# Patient Record
Sex: FEMALE | Race: WHITE | NOT HISPANIC OR LATINO | URBAN - METROPOLITAN AREA
[De-identification: names, ages, dates, MRNs, and addresses within clinical notes are randomized per-mention and may not be internally consistent; named-entity substitution may affect disease eponyms.]

---

## 2020-10-02 ENCOUNTER — INPATIENT (INPATIENT)
Facility: HOSPITAL | Age: 80
LOS: 0 days | Discharge: HOME | DRG: 815 | End: 2020-10-03
Attending: SURGERY | Admitting: SURGERY
Payer: MEDICARE

## 2020-10-02 VITALS
HEART RATE: 93 BPM | RESPIRATION RATE: 18 BRPM | OXYGEN SATURATION: 99 % | HEIGHT: 66 IN | SYSTOLIC BLOOD PRESSURE: 155 MMHG | TEMPERATURE: 98 F | DIASTOLIC BLOOD PRESSURE: 73 MMHG | WEIGHT: 134.92 LBS

## 2020-10-02 DIAGNOSIS — Z90.49 ACQUIRED ABSENCE OF OTHER SPECIFIED PARTS OF DIGESTIVE TRACT: Chronic | ICD-10-CM

## 2020-10-02 DIAGNOSIS — Z98.890 OTHER SPECIFIED POSTPROCEDURAL STATES: Chronic | ICD-10-CM

## 2020-10-02 DIAGNOSIS — Z93.3 COLOSTOMY STATUS: Chronic | ICD-10-CM

## 2020-10-02 LAB
APTT BLD: 22.8 SEC — LOW (ref 27.5–35.5)
BLD GP AB SCN SERPL QL: NEGATIVE — SIGNIFICANT CHANGE UP
HCT VFR BLD CALC: 42 % — SIGNIFICANT CHANGE UP (ref 34.5–45)
HGB BLD-MCNC: 13.4 G/DL — SIGNIFICANT CHANGE UP (ref 11.5–15.5)
INR BLD: 0.91 — SIGNIFICANT CHANGE UP (ref 0.88–1.16)
MCHC RBC-ENTMCNC: 31.4 PG — SIGNIFICANT CHANGE UP (ref 27–34)
MCHC RBC-ENTMCNC: 31.9 GM/DL — LOW (ref 32–36)
MCV RBC AUTO: 98.4 FL — SIGNIFICANT CHANGE UP (ref 80–100)
NRBC # BLD: 0 /100 WBCS — SIGNIFICANT CHANGE UP (ref 0–0)
PLATELET # BLD AUTO: 157 K/UL — SIGNIFICANT CHANGE UP (ref 150–400)
PROTHROM AB SERPL-ACNC: 11 SEC — SIGNIFICANT CHANGE UP (ref 10.6–13.6)
RBC # BLD: 4.27 M/UL — SIGNIFICANT CHANGE UP (ref 3.8–5.2)
RBC # FLD: 12.4 % — SIGNIFICANT CHANGE UP (ref 10.3–14.5)
RH IG SCN BLD-IMP: POSITIVE — SIGNIFICANT CHANGE UP
SARS-COV-2 RNA SPEC QL NAA+PROBE: SIGNIFICANT CHANGE UP
WBC # BLD: 9.24 K/UL — SIGNIFICANT CHANGE UP (ref 3.8–10.5)
WBC # FLD AUTO: 9.24 K/UL — SIGNIFICANT CHANGE UP (ref 3.8–10.5)

## 2020-10-02 PROCEDURE — 99285 EMERGENCY DEPT VISIT HI MDM: CPT

## 2020-10-02 PROCEDURE — 93010 ELECTROCARDIOGRAM REPORT: CPT

## 2020-10-02 PROCEDURE — 71045 X-RAY EXAM CHEST 1 VIEW: CPT | Mod: 26

## 2020-10-02 RX ORDER — MORPHINE SULFATE 50 MG/1
4 CAPSULE, EXTENDED RELEASE ORAL ONCE
Refills: 0 | Status: DISCONTINUED | OUTPATIENT
Start: 2020-10-02 | End: 2020-10-02

## 2020-10-02 RX ORDER — SODIUM CHLORIDE 9 MG/ML
1000 INJECTION INTRAMUSCULAR; INTRAVENOUS; SUBCUTANEOUS
Refills: 0 | Status: DISCONTINUED | OUTPATIENT
Start: 2020-10-02 | End: 2020-10-02

## 2020-10-02 RX ORDER — SODIUM CHLORIDE 9 MG/ML
1000 INJECTION, SOLUTION INTRAVENOUS
Refills: 0 | Status: DISCONTINUED | OUTPATIENT
Start: 2020-10-02 | End: 2020-10-03

## 2020-10-02 RX ORDER — LEVOTHYROXINE SODIUM 125 MCG
50 TABLET ORAL AT BEDTIME
Refills: 0 | Status: DISCONTINUED | OUTPATIENT
Start: 2020-10-02 | End: 2020-10-03

## 2020-10-02 RX ORDER — PANTOPRAZOLE SODIUM 20 MG/1
40 TABLET, DELAYED RELEASE ORAL DAILY
Refills: 0 | Status: DISCONTINUED | OUTPATIENT
Start: 2020-10-02 | End: 2020-10-03

## 2020-10-02 RX ADMIN — SODIUM CHLORIDE 100 MILLILITER(S): 9 INJECTION INTRAMUSCULAR; INTRAVENOUS; SUBCUTANEOUS at 19:26

## 2020-10-02 RX ADMIN — MORPHINE SULFATE 4 MILLIGRAM(S): 50 CAPSULE, EXTENDED RELEASE ORAL at 19:41

## 2020-10-02 RX ADMIN — MORPHINE SULFATE 4 MILLIGRAM(S): 50 CAPSULE, EXTENDED RELEASE ORAL at 19:26

## 2020-10-02 NOTE — ED ADULT NURSE NOTE - OBJECTIVE STATEMENT
81 yo female c/o abdominal pain. Pt. went to endoscopy and colonoscopy, pt. reports feeling abdominal pain upon awakening and CT Scan revealed splenic hemorrhage. Denies chest pain, SOB, fever/chills, n/v/d.

## 2020-10-02 NOTE — H&P ADULT - NSICDXPASTSURGICALHX_GEN_ALL_CORE_FT
PAST SURGICAL HISTORY:  H/O shoulder surgery     History of ankle surgery     History of appendectomy     S/P cholecystectomy     S/P colostomy

## 2020-10-02 NOTE — ED ADULT NURSE NOTE - NSIMPLEMENTINTERV_GEN_ALL_ED
Implemented All Universal Safety Interventions:  [employer] [place] to call system. Call bell, personal items and telephone within reach. Instruct patient to call for assistance. Room bathroom lighting operational. Non-slip footwear when patient is off stretcher. Physically safe environment: no spills, clutter or unnecessary equipment. Stretcher in lowest position, wheels locked, appropriate side rails in place.

## 2020-10-02 NOTE — ED PROVIDER NOTE - PROGRESS NOTE DETAILS
Surgery consulted and will see the pt. images being uploaded at this time Verbal report from Dr Younger: small amount of blood around the spleen, no clear laceration seen Pt seen by gen surgery - admit to Dr Murphy, tele bed. No acute surgical intervention at this time

## 2020-10-02 NOTE — ED PROVIDER NOTE - PHYSICAL EXAMINATION
Limited PE performed in the setting of the COVID10 pandemic, in efforts to limit exposure and cross-contamination  Constitutional: Well appearing, well nourished, awake, alert, oriented to person, place, time/situation and in no apparent distress.  ENMT: Airway patent.   Eyes: Clear bilaterally  Cardiac: Normal rate, regular rhythm.   Respiratory: No increased WOB, tachypnea, hypoxia, or accessory mm use. Pt speaks in full sentences.   Gastrointestinal: Abd soft, + ttp in the LUQ / epigastric region. ND. No guarding, rebound, or rigidity.   Musculoskeletal: Range of motion is not limited  Neuro: Alert and oriented x 3, face symmetric and speech fluent. Nml gross motor movement, grossly non focal   Skin: Skin normal color for race, warm, dry and intact. No evidence of rash.  Psych: Alert and oriented to person, place, time/situation. normal mood and affect. no apparent risk to self or others.

## 2020-10-02 NOTE — H&P ADULT - NSHPPHYSICALEXAM_GEN_ALL_CORE
T(C): 36.4 (10-02-20 @ 15:50), Max: 36.4 (10-02-20 @ 15:50)  HR: 84 (10-02-20 @ 17:32) (84 - 93)  BP: 169/71 (10-02-20 @ 19:46) (143/66 - 169/71)  RR: 18 (10-02-20 @ 17:32) (18 - 18)  SpO2: 99% (10-02-20 @ 17:32) (99% - 99%)    GENERAL: NAD, Resting comfortably in bed, awake  HEENT: NCAT, MMM, Trachea midline, Normal conjunctiva, PERRL  RESP: Nonlabored breathing, No respiratory distress  CARD: Normal rate, Normal peripheral perfusion  GI: Soft, ND, mod TTP LUQ, No guarding, No rebound tenderness  EXTREM: WWP, No edema, No gross deformity of extremities  SKIN: No rashes, no lesions  NEURO: AAOx3, No focal motor or sensory deficits  PSYCH: Affect and characteristics of appearance, verbalizations, behaviors are appropriate

## 2020-10-02 NOTE — H&P ADULT - ATTENDING COMMENTS
Patient seen and examined at bedside on 10/3/2020.  Feeling better.  Stated pain is moving toward her back.  Abdomen soft, minimal tenderness.  Labs, outside imaging reviewed.    Serial H/H have been stable.  Advance diet  Discharge home

## 2020-10-02 NOTE — ED ADULT TRIAGE NOTE - CHIEF COMPLAINT QUOTE
"I just had a colonoscopy and endoscopy and when I woke up from the anesthesia I got a terrible pain in my stomach. I had a CT done and the radiologist said they saw some blood and told me to come in. I'm being admitted."

## 2020-10-02 NOTE — H&P ADULT - NSICDXPASTMEDICALHX_GEN_ALL_CORE_FT
PAST MEDICAL HISTORY:  Diabetes mellitus with insulin therapy resolved    Diverticulitis     History of herpes zoster of eye     Hypothyroidism     Nephrotic syndrome

## 2020-10-02 NOTE — ED PROVIDER NOTE - OBJECTIVE STATEMENT
Pt w/ PMHx Pt w/ PMHx of hypothyroidism, nephrotic syndrome <was on chronic long term steroids c/b DM (now resolved), bowel perforation s/p colostomy in 2007 with reversal 9 mos later, AMANDA/BSO, open cholecystectomy, lap appendectomy, today referred to the ED by GI Dr Younger. Pt had EGD and colonoscopy earlier today. S/p the procedure, pt began c/o LUQ pain. Pt was sent for outpt CT a/p, which pt reports she was told she had "a small amount of bleeding around the spleen." Pt presents CD w/ imaging to be uploaded into pacs, but does not have written report. Pt reports 3/10 pain. Denies n/v/d/c, f/c. Pt denies feeling faint, weakness / fatigue, CP, SOB.

## 2020-10-02 NOTE — ED PROVIDER NOTE - CLINICAL SUMMARY MEDICAL DECISION MAKING FREE TEXT BOX
Pt presents to the ED w/ abd pain s/p EGD / cscope earlier today w/ CT showing splenic injury. Pre-op labs, frequent monitoring of VS, large bore IV, surgery consult. Anticipate admission to surgery for obs vs surgical intervention.

## 2020-10-02 NOTE — H&P ADULT - HISTORY OF PRESENT ILLNESS
80F PMHx of nephrotic syndrome, bowel perforation 2/2 diverticulitis s/p colostomy in 2007 at [facility] (with reversal 9 mos later), AMANDA/BSO, open cholecystectomy, lap appendectomy, IDDM (resolved, hypothyroidism, herpetic keratitis, who presents with abd pain x 1 day. Pt underwent EGD/colonoscopy today for workup of narrowing of stool caliber and fecal incontinence. Reports the development of acute abd pain immediately after waking up, underwent outpatient CT scan and was told by radiologist that she had blood surrounding her spleen, so she was sent to the ED. In the ED patient afebrile, HR 93, /73, O2 99% on RA. WBC 10.6, Hgb 14.1. Outpatient CT scan showing small/moderate bleed near splenic recess. 80F PMHx of nephrotic syndrome, bowel perforation 2/2 diverticulitis s/p colostomy in 2007 at [facility] (with reversal 9 mos later), AMANDA/BSO, open cholecystectomy, lap appendectomy, IDDM (resolved, hypothyroidism, herpetic keratitis, who presents with abd pain x 1 day. Pt underwent EGD/colonoscopy today for workup of narrowing of stool caliber and fecal incontinence. Reports the development of acute abd pain immediately after waking up, underwent outpatient CT scan and was told by radiologist that she had blood surrounding her spleen, so she was sent to the ED. In the ED patient afebrile, HR 93, /73, O2 99% on RA. WBC 10.6, Hgb 14.1. Outpatient CT scan showing small/moderate bleed near splenic recess.     Meds: B12 5k-100mcg daily, biotin 10 mg daily, VitD3 10,000 unit daily, cilostazol 200 mg daily, famotidine 40 qHS, pantoprazole 40 daily, restoril 30 mg qHS, synthroid 50 mcg every other day, synthroid 75 mcg EOD, ultram 50 mg daily prn pain, Valtrex 500 mg daily, Xanax 0.5 mg qHS, zetia 10 mg daily

## 2020-10-02 NOTE — H&P ADULT - ASSESSMENT
80F PMHx of nephrotic syndrome, bowel perforation 2/2 diverticulitis s/p colostomy in 2007 at [facility] (with reversal 9 mos later), AMANDA/BSO, open cholecystectomy, lap appendectomy, IDDM (resolved), hypothyroidism, who presents with abd pain and splenic bleed after colonoscopy today.     - Admit to General surgery, team 4, Dr. Murphy  - Minimize pain meds  - NPO/IVF  - Serial abd exams  - Trend H/H q6h  - Inform IR of patient  - CTA if patient’s exam worsens or if H/H continues to downtrend  - Preoperative labs/studies  - SCDs, no chemoppx given bleed  - AM labs

## 2020-10-02 NOTE — H&P ADULT - NSHPLABSRESULTS_GEN_ALL_CORE
14.1   10.63 )-----------( 177      ( 02 Oct 2020 17:30 )             43.9   10-02    143  |  104  |  18  ----------------------------<  90  4.3   |  29  |  0.80    Ca    9.0      02 Oct 2020 17:30    TPro  6.7  /  Alb  4.2  /  TBili  0.4  /  DBili  x   /  AST  35  /  ALT  30  /  AlkPhos  113  10-02    Outside CT showing bleed near spleen. No formal radiology read.

## 2020-10-02 NOTE — ED ADULT NURSE NOTE - PMH
Diabetes mellitus with insulin therapy  resolved  Diverticulitis    History of herpes zoster of eye    Hypothyroidism    Nephrotic syndrome

## 2020-10-02 NOTE — H&P ADULT - NUTRITIONAL ASSESSMENT
80F PMHx of nephrotic syndrome, bowel perforation 2/2 diverticulitis s/p colostomy in 2007 at [facility] (with reversal 9 mos later), AMANDA/BSO, open cholecystectomy, lap appendectomy, IDDM (resolved), hypothyroidism, who presents with abd pain and splenic bleed after colonoscopy today.     - Admit to General surgery, team 4, Dr. Murphy  - Minimize pain meds  - NPO/IVF  - Serial abd exams  - Trend H/H q6h  - Inform IR of patient  - CTA if patient’s exam worsens or if H/H continues to downtrend  - Preoperative labs/studies  - SCDs, no chemoppx given bleed  - AM labs

## 2020-10-03 ENCOUNTER — TRANSCRIPTION ENCOUNTER (OUTPATIENT)
Age: 80
End: 2020-10-03

## 2020-10-03 VITALS
RESPIRATION RATE: 18 BRPM | OXYGEN SATURATION: 98 % | SYSTOLIC BLOOD PRESSURE: 141 MMHG | DIASTOLIC BLOOD PRESSURE: 73 MMHG | HEART RATE: 116 BPM

## 2020-10-03 DIAGNOSIS — N04.9 NEPHROTIC SYNDROME WITH UNSPECIFIED MORPHOLOGIC CHANGES: ICD-10-CM

## 2020-10-03 DIAGNOSIS — E03.9 HYPOTHYROIDISM, UNSPECIFIED: ICD-10-CM

## 2020-10-03 DIAGNOSIS — S36.039A UNSPECIFIED LACERATION OF SPLEEN, INITIAL ENCOUNTER: ICD-10-CM

## 2020-10-03 LAB
ANION GAP SERPL CALC-SCNC: 10 MMOL/L — SIGNIFICANT CHANGE UP (ref 5–17)
ANION GAP SERPL CALC-SCNC: 12 MMOL/L — SIGNIFICANT CHANGE UP (ref 5–17)
BLD GP AB SCN SERPL QL: NEGATIVE — SIGNIFICANT CHANGE UP
BUN SERPL-MCNC: 15 MG/DL — SIGNIFICANT CHANGE UP (ref 7–23)
BUN SERPL-MCNC: 15 MG/DL — SIGNIFICANT CHANGE UP (ref 7–23)
CALCIUM SERPL-MCNC: 9 MG/DL — SIGNIFICANT CHANGE UP (ref 8.4–10.5)
CALCIUM SERPL-MCNC: 9 MG/DL — SIGNIFICANT CHANGE UP (ref 8.4–10.5)
CHLORIDE SERPL-SCNC: 105 MMOL/L — SIGNIFICANT CHANGE UP (ref 96–108)
CHLORIDE SERPL-SCNC: 106 MMOL/L — SIGNIFICANT CHANGE UP (ref 96–108)
CO2 SERPL-SCNC: 26 MMOL/L — SIGNIFICANT CHANGE UP (ref 22–31)
CO2 SERPL-SCNC: 27 MMOL/L — SIGNIFICANT CHANGE UP (ref 22–31)
CREAT SERPL-MCNC: 0.68 MG/DL — SIGNIFICANT CHANGE UP (ref 0.5–1.3)
CREAT SERPL-MCNC: 0.72 MG/DL — SIGNIFICANT CHANGE UP (ref 0.5–1.3)
GLUCOSE SERPL-MCNC: 86 MG/DL — SIGNIFICANT CHANGE UP (ref 70–99)
GLUCOSE SERPL-MCNC: 88 MG/DL — SIGNIFICANT CHANGE UP (ref 70–99)
HCT VFR BLD CALC: 38.3 % — SIGNIFICANT CHANGE UP (ref 34.5–45)
HCT VFR BLD CALC: 41 % — SIGNIFICANT CHANGE UP (ref 34.5–45)
HCT VFR BLD CALC: 41.4 % — SIGNIFICANT CHANGE UP (ref 34.5–45)
HGB BLD-MCNC: 12.5 G/DL — SIGNIFICANT CHANGE UP (ref 11.5–15.5)
HGB BLD-MCNC: 13.2 G/DL — SIGNIFICANT CHANGE UP (ref 11.5–15.5)
HGB BLD-MCNC: 13.5 G/DL — SIGNIFICANT CHANGE UP (ref 11.5–15.5)
MAGNESIUM SERPL-MCNC: 2 MG/DL — SIGNIFICANT CHANGE UP (ref 1.6–2.6)
MAGNESIUM SERPL-MCNC: 2.1 MG/DL — SIGNIFICANT CHANGE UP (ref 1.6–2.6)
MCHC RBC-ENTMCNC: 31.2 PG — SIGNIFICANT CHANGE UP (ref 27–34)
MCHC RBC-ENTMCNC: 31.8 PG — SIGNIFICANT CHANGE UP (ref 27–34)
MCHC RBC-ENTMCNC: 31.8 PG — SIGNIFICANT CHANGE UP (ref 27–34)
MCHC RBC-ENTMCNC: 32.2 GM/DL — SIGNIFICANT CHANGE UP (ref 32–36)
MCHC RBC-ENTMCNC: 32.6 GM/DL — SIGNIFICANT CHANGE UP (ref 32–36)
MCHC RBC-ENTMCNC: 32.6 GM/DL — SIGNIFICANT CHANGE UP (ref 32–36)
MCV RBC AUTO: 96.9 FL — SIGNIFICANT CHANGE UP (ref 80–100)
MCV RBC AUTO: 97.4 FL — SIGNIFICANT CHANGE UP (ref 80–100)
MCV RBC AUTO: 97.5 FL — SIGNIFICANT CHANGE UP (ref 80–100)
NRBC # BLD: 0 /100 WBCS — SIGNIFICANT CHANGE UP (ref 0–0)
PHOSPHATE SERPL-MCNC: 3.1 MG/DL — SIGNIFICANT CHANGE UP (ref 2.5–4.5)
PHOSPHATE SERPL-MCNC: 3.1 MG/DL — SIGNIFICANT CHANGE UP (ref 2.5–4.5)
PLATELET # BLD AUTO: 154 K/UL — SIGNIFICANT CHANGE UP (ref 150–400)
PLATELET # BLD AUTO: 161 K/UL — SIGNIFICANT CHANGE UP (ref 150–400)
PLATELET # BLD AUTO: 184 K/UL — SIGNIFICANT CHANGE UP (ref 150–400)
POTASSIUM SERPL-MCNC: 3.8 MMOL/L — SIGNIFICANT CHANGE UP (ref 3.5–5.3)
POTASSIUM SERPL-MCNC: 4 MMOL/L — SIGNIFICANT CHANGE UP (ref 3.5–5.3)
POTASSIUM SERPL-SCNC: 3.8 MMOL/L — SIGNIFICANT CHANGE UP (ref 3.5–5.3)
POTASSIUM SERPL-SCNC: 4 MMOL/L — SIGNIFICANT CHANGE UP (ref 3.5–5.3)
RBC # BLD: 3.93 M/UL — SIGNIFICANT CHANGE UP (ref 3.8–5.2)
RBC # BLD: 4.23 M/UL — SIGNIFICANT CHANGE UP (ref 3.8–5.2)
RBC # BLD: 4.25 M/UL — SIGNIFICANT CHANGE UP (ref 3.8–5.2)
RBC # FLD: 12.2 % — SIGNIFICANT CHANGE UP (ref 10.3–14.5)
RBC # FLD: 12.2 % — SIGNIFICANT CHANGE UP (ref 10.3–14.5)
RBC # FLD: 12.3 % — SIGNIFICANT CHANGE UP (ref 10.3–14.5)
RH IG SCN BLD-IMP: POSITIVE — SIGNIFICANT CHANGE UP
SODIUM SERPL-SCNC: 142 MMOL/L — SIGNIFICANT CHANGE UP (ref 135–145)
SODIUM SERPL-SCNC: 144 MMOL/L — SIGNIFICANT CHANGE UP (ref 135–145)
WBC # BLD: 6.3 K/UL — SIGNIFICANT CHANGE UP (ref 3.8–10.5)
WBC # BLD: 6.96 K/UL — SIGNIFICANT CHANGE UP (ref 3.8–10.5)
WBC # BLD: 8.5 K/UL — SIGNIFICANT CHANGE UP (ref 3.8–10.5)
WBC # FLD AUTO: 6.3 K/UL — SIGNIFICANT CHANGE UP (ref 3.8–10.5)
WBC # FLD AUTO: 6.96 K/UL — SIGNIFICANT CHANGE UP (ref 3.8–10.5)
WBC # FLD AUTO: 8.5 K/UL — SIGNIFICANT CHANGE UP (ref 3.8–10.5)

## 2020-10-03 PROCEDURE — 99222 1ST HOSP IP/OBS MODERATE 55: CPT | Mod: GC

## 2020-10-03 PROCEDURE — 99223 1ST HOSP IP/OBS HIGH 75: CPT

## 2020-10-03 RX ORDER — LANOLIN ALCOHOL/MO/W.PET/CERES
3 CREAM (GRAM) TOPICAL ONCE
Refills: 0 | Status: COMPLETED | OUTPATIENT
Start: 2020-10-03 | End: 2020-10-03

## 2020-10-03 RX ORDER — ACETAMINOPHEN 500 MG
1000 TABLET ORAL ONCE
Refills: 0 | Status: COMPLETED | OUTPATIENT
Start: 2020-10-03 | End: 2020-10-03

## 2020-10-03 RX ADMIN — Medication 50 MICROGRAM(S): at 01:25

## 2020-10-03 RX ADMIN — Medication 1000 MILLIGRAM(S): at 07:30

## 2020-10-03 RX ADMIN — PANTOPRAZOLE SODIUM 40 MILLIGRAM(S): 20 TABLET, DELAYED RELEASE ORAL at 11:28

## 2020-10-03 RX ADMIN — Medication 3 MILLIGRAM(S): at 01:24

## 2020-10-03 RX ADMIN — Medication 400 MILLIGRAM(S): at 07:03

## 2020-10-03 NOTE — PROGRESS NOTE ADULT - ASSESSMENT
80yF who underwent EGD/colonoscopy on 10/2 for workup of narrowing of stool caliber and fecal incontinence, subsequently developed acute abdominal pain and found on CT to have small/moderate bleeding near the splenic recess. Abd pain is improving this morning. Hgb 12.5 from 13.2 at 3am.      - Minimize pain meds  - NPO/IVF  - Serial abd exams  - Trend H/H q6h  - CTA if patient’s exam worsens or if H/H continues to downtrend  - SCDs, no chemoppx given bleed  - AM labs

## 2020-10-03 NOTE — CONSULT NOTE ADULT - ASSESSMENT
80yF who underwent EGD/colonoscopy on 10/2 for workup of narrowing of stool caliber and fecal incontinence, subsequently developed acute abdominal pain and found on CT to have small/moderate bleeding near the splenic recess now w/ improved symptoms

## 2020-10-03 NOTE — CONSULT NOTE ADULT - SUBJECTIVE AND OBJECTIVE BOX
Patient is a 80y old  Female who presents with a chief complaint of Splenic bleed (03 Oct 2020 15:08)      HPI:  80F PMHx of nephrotic syndrome, bowel perforation 2/2 diverticulitis s/p colostomy in 2007 at [facility] (with reversal 9 mos later), AMANDA/BSO, open cholecystectomy, lap appendectomy, IDDM (resolved, hypothyroidism, herpetic keratitis, who presents with abd pain x 1 day. Pt underwent EGD/colonoscopy today for workup of narrowing of stool caliber and fecal incontinence. Reports the development of acute abd pain immediately after waking up, underwent outpatient CT scan and was told by radiologist that she had blood surrounding her spleen, so she was sent to the ED. In the ED patient afebrile, HR 93, /73, O2 99% on RA. WBC 10.6, Hgb 14.1. Outpatient CT scan showing small/moderate bleed near splenic recess.     Meds: B12 5k-100mcg daily, biotin 10 mg daily, VitD3 10,000 unit daily, cilostazol 200 mg daily, famotidine 40 qHS, pantoprazole 40 daily, restoril 30 mg qHS, synthroid 50 mcg every other day, synthroid 75 mcg EOD, ultram 50 mg daily prn pain, Valtrex 500 mg daily, Xanax 0.5 mg qHS, zetia 10 mg daily   (02 Oct 2020 20:15)    Subjective:  Pt feels improved. She has residual pain in her LUQ but it is improving. Denies SOB, CP. ROS is otherwise negative.     Allergies    Lasix (Other)  penicillin (Other)    Intolerances    Home meds: none    MEDICATIONS  (STANDING):  lactated ringers. 1000 milliLiter(s) (100 mL/Hr) IV Continuous <Continuous>  levothyroxine Injectable 50 MICROGram(s) IV Push at bedtime  pantoprazole  Injectable 40 milliGRAM(s) IV Push daily    MEDICATIONS  (PRN):      Drug Dosing Weight  Height (cm): 167.6 (02 Oct 2020 15:50)  Weight (kg): 61.2 (02 Oct 2020 15:50)  BMI (kg/m2): 21.8 (02 Oct 2020 15:50)  BSA (m2): 1.69 (02 Oct 2020 15:50)    PAST MEDICAL & SURGICAL HISTORY:  History of herpes zoster of eye    Hypothyroidism    Diabetes mellitus with insulin therapy  resolved    Diverticulitis    Nephrotic syndrome    H/O shoulder surgery    History of ankle surgery    S/P colostomy    History of appendectomy    S/P cholecystectomy        FAMILY HISTORY:  No pertinent family history in first degree relatives of cardiac disease    SOCIAL HISTORY:  no smoking, social EtOH use, no drug use    ADVANCE DIRECTIVES:    Vital Signs Last 24 Hrs  T(C): 36.8 (03 Oct 2020 13:00), Max: 36.8 (03 Oct 2020 09:23)  T(F): 98.2 (03 Oct 2020 13:00), Max: 98.3 (03 Oct 2020 09:23)  HR: 116 (03 Oct 2020 15:53) (72 - 116)  BP: 141/73 (03 Oct 2020 15:53) (141/73 - 171/73)  BP(mean): 96 (03 Oct 2020 15:53) (91 - 105)  ABP: --  ABP(mean): --  RR: 18 (03 Oct 2020 15:53) (18 - 18)  SpO2: 98% (03 Oct 2020 15:53) (96% - 98%)          I&O's Detail    02 Oct 2020 07:01  -  03 Oct 2020 07:00  --------------------------------------------------------  IN:    Lactated Ringers: 800 mL  Total IN: 800 mL    OUT:    Voided (mL): 500 mL  Total OUT: 500 mL    Total NET: 300 mL      03 Oct 2020 07:01  -  03 Oct 2020 22:51  --------------------------------------------------------  IN:    Lactated Ringers: 900 mL  Total IN: 900 mL    OUT:    Voided (mL): 200 mL  Total OUT: 200 mL    Total NET: 700 mL          PHYSICAL EXAM:      Constitutional: NAD  Eyes: PERRLA  ENMT: MMM  Neck: supple  Back: midline  Respiratory: CTA b/l  Cardiovascular: rrr, s1s2, no m/r/g  Gastrointestinal: TTP in LUQ  Extremities: wwp  Vascular: + 2 pulses radial, LLE edema in foot (pt reports is chronic)  Neurological: AAO x 4  Skin: no rash  Lymph Nodes: no LAD  Musculoskeletal: no joint swelling  Psychiatric: normal affect        LABS:  CBC Full  -  ( 03 Oct 2020 12:28 )  WBC Count : 8.50 K/uL  RBC Count : 4.25 M/uL  Hemoglobin : 13.5 g/dL  Hematocrit : 41.4 %  Platelet Count - Automated : 161 K/uL  Mean Cell Volume : 97.4 fl  Mean Cell Hemoglobin : 31.8 pg  Mean Cell Hemoglobin Concentration : 32.6 gm/dL  Auto Neutrophil # : x  Auto Lymphocyte # : x  Auto Monocyte # : x  Auto Eosinophil # : x  Auto Basophil # : x  Auto Neutrophil % : x  Auto Lymphocyte % : x  Auto Monocyte % : x  Auto Eosinophil % : x  Auto Basophil % : x    10-03    142  |  105  |  15  ----------------------------<  88  4.0   |  27  |  0.68    Ca    9.0      03 Oct 2020 06:11  Phos  3.1     10-03  Mg     2.0     10-03    TPro  6.7  /  Alb  4.2  /  TBili  0.4  /  DBili  x   /  AST  35  /  ALT  30  /  AlkPhos  113  10-02    CAPILLARY BLOOD GLUCOSE        PT/INR - ( 02 Oct 2020 17:30 )   PT: 11.0 sec;   INR: 0.91          PTT - ( 02 Oct 2020 17:30 )  PTT:22.8 sec      EKG:    ECHO, US:    RADIOLOGY:

## 2020-10-03 NOTE — CHART NOTE - NSCHARTNOTEFT_GEN_A_CORE
Serial Abdominal Exam    Exam done at 23:00 on 10/2/2020    Subjective: Patient describes that she is feeling well. Does not have any nausea or vomiting. Only symptom is abdominal pain that is localized to the upper left side of her abdomen.     Objective:  ICU Vital Signs Last 24 Hrs  T(C): 36.4 (02 Oct 2020 15:50), Max: 36.4 (02 Oct 2020 15:50)  T(F): 97.6 (02 Oct 2020 15:50), Max: 97.6 (02 Oct 2020 15:50)  HR: 82 (02 Oct 2020 22:40) (82 - 93)  BP: 169/72 (02 Oct 2020 22:40) (143/66 - 169/72)  BP(mean): 103 (02 Oct 2020 22:40) (103 - 103)  ABP: --  ABP(mean): --  RR: 18 (02 Oct 2020 22:40) (18 - 18)  SpO2: 97% (02 Oct 2020 22:40) (97% - 99%)      Abdominal Exam:  Abdomen soft throughout.  Abdomen grossly nondistended.  Abdomen tender to deep palpation only of upper left quadrant, otherwise nontender in all other quadrants.  No rebound tenderness. No splenomegaly.    Assessment and Plan  Ms. Coker is a 80yF who presents to [facility] with chief complaint of upper left sided abdominal pain hours after undergoing EGD/colonoscopy earlier today with CT scan demonstrating small/moderate bleed of the splenic recess.    Plan  Continue serial abdominal exams  F/u Labs; will get full labs at 3AM on 10/3/2020

## 2020-10-03 NOTE — PROGRESS NOTE ADULT - SUBJECTIVE AND OBJECTIVE BOX
SUBJECTIVE: Patient seen and examined bedside by chief resident. Pt reports that her LUQ abd pain is improving, but still has pain with deep inspiration and coughing. Throat is still sore from EGD. Doing well otherwise.        Vital Signs Last 24 Hrs  T(C): 36.3 (03 Oct 2020 04:50), Max: 36.4 (02 Oct 2020 15:50)  T(F): 97.4 (03 Oct 2020 04:50), Max: 97.6 (02 Oct 2020 15:50)  HR: 80 (03 Oct 2020 08:13) (72 - 93)  BP: 155/66 (03 Oct 2020 08:13) (142/64 - 171/73)  BP(mean): 95 (03 Oct 2020 08:13) (92 - 105)  RR: 18 (03 Oct 2020 08:13) (18 - 18)  SpO2: 96% (03 Oct 2020 08:13) (96% - 99%)  I&O's Detail    02 Oct 2020 07:01  -  03 Oct 2020 07:00  --------------------------------------------------------  IN:    Lactated Ringers: 800 mL  Total IN: 800 mL    OUT:    Voided (mL): 500 mL  Total OUT: 500 mL    Total NET: 300 mL          Physical Exam:  General: No acute distress, resting comfortably in bed  Pulm: Nonlabored breathing, no respiratory distress  Abd: soft, non-distended, TTP in LUQ      LABS:                        12.5   6.30  )-----------( 154      ( 03 Oct 2020 06:11 )             38.3     10-03    142  |  105  |  15  ----------------------------<  88  4.0   |  27  |  0.68    Ca    9.0      03 Oct 2020 06:11  Phos  3.1     10-03  Mg     2.0     10-03    TPro  6.7  /  Alb  4.2  /  TBili  0.4  /  DBili  x   /  AST  35  /  ALT  30  /  AlkPhos  113  10-02    PT/INR - ( 02 Oct 2020 17:30 )   PT: 11.0 sec;   INR: 0.91          PTT - ( 02 Oct 2020 17:30 )  PTT:22.8 sec      RADIOLOGY & ADDITIONAL STUDIES:

## 2020-10-03 NOTE — DISCHARGE NOTE NURSING/CASE MANAGEMENT/SOCIAL WORK - PATIENT PORTAL LINK FT
You can access the FollowMyHealth Patient Portal offered by [facility] by registering at the following website: [email or web address]. By joining [facility]’s FollowMyHealth portal, you will also be able to view your health information using other applications (apps) compatible with our system.

## 2020-10-03 NOTE — DISCHARGE NOTE PROVIDER - NSDCFUADDINST_GEN_ALL_CORE_FT
Please follow up with your Gastroenterologist at your normal schedule.    Warning Signs:  Please call your doctor or nurse practitioner if you experience the following:  *If you are vomiting and cannot keep down fluids or your medications.  *You are getting dehydrated due to continued vomiting, diarrhea, or other reasons. Signs of dehydration include dry mouth, rapid heartbeat, or feeling dizzy or faint when standing.  *You see blood or dark/black material when you vomit or have a bowel movement.  *Your pain is not improving within 8-12 hours or is not gone within 24 hours. Call or return immediately if your pain is getting worse, changes location, or moves to your chest or back.  *You have shaking chills, or fever greater than 101.5 degrees Fahrenheit or 38 degrees Celsius.  *Any change in your symptoms, or any new symptoms that concern you.

## 2020-10-03 NOTE — DISCHARGE NOTE PROVIDER - NSDCCPCAREPLAN_GEN_ALL_CORE_FT
PRINCIPAL DISCHARGE DIAGNOSIS  Diagnosis: Laceration of spleen, initial encounter  Assessment and Plan of Treatment:

## 2020-10-03 NOTE — DISCHARGE NOTE PROVIDER - HOSPITAL COURSE
80F PMHx of nephrotic syndrome, bowel perforation 2/2 diverticulitis s/p colostomy in 2007 at [facility] (with reversal 9 mos later), AMANDA/BSO, open cholecystectomy, lap appendectomy, IDDM (resolved, hypothyroidism, herpetic keratitis, who presents with abd pain x 1 day. Pt underwent EGD/colonoscopy today for workup of narrowing of stool caliber and fecal incontinence. Reports the development of acute abd pain immediately after waking up, underwent outpatient CT scan and was told by radiologist that she had blood surrounding her spleen, so she was sent to the ED. In the ED patient afebrile, HR 93, /73, O2 99% on RA. WBC 10.6, Hgb 14.1. Outpatient CT scan showing small/moderate bleed near splenic recess. Pt admitted to general surgery. She was noted to be hemodinamically stable with a stable Hgb trend, Pt reported decreasing LUQ pain on admission day 1. Hgb at noon  was 13.5. She was discharged home with plan to follow up with her gastroenterologist as needed.

## 2020-10-07 ENCOUNTER — TRANSCRIPTION ENCOUNTER (OUTPATIENT)
Age: 80
End: 2020-10-07

## 2020-10-08 DIAGNOSIS — G47.00 INSOMNIA, UNSPECIFIED: ICD-10-CM

## 2020-10-08 DIAGNOSIS — Z88.8 ALLERGY STATUS TO OTHER DRUGS, MEDICAMENTS AND BIOLOGICAL SUBSTANCES: ICD-10-CM

## 2020-10-08 DIAGNOSIS — E03.9 HYPOTHYROIDISM, UNSPECIFIED: ICD-10-CM

## 2020-10-08 DIAGNOSIS — R15.9 FULL INCONTINENCE OF FECES: ICD-10-CM

## 2020-10-08 DIAGNOSIS — S36.039A UNSPECIFIED LACERATION OF SPLEEN, INITIAL ENCOUNTER: ICD-10-CM

## 2020-10-08 DIAGNOSIS — Z88.0 ALLERGY STATUS TO PENICILLIN: ICD-10-CM
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
## 2020-10-08 DIAGNOSIS — N04.9 NEPHROTIC SYNDROME WITH UNSPECIFIED MORPHOLOGIC CHANGES: ICD-10-CM

## 2020-10-08 DIAGNOSIS — D78.12 ACCIDENTAL PUNCTURE AND LACERATION OF THE SPLEEN DURING OTHER PROCEDURE: ICD-10-CM

## 2020-10-08 DIAGNOSIS — B02.39 OTHER HERPES ZOSTER EYE DISEASE: ICD-10-CM

## 2020-10-21 PROCEDURE — 85025 COMPLETE CBC W/AUTO DIFF WBC: CPT

## 2020-10-21 PROCEDURE — 99285 EMERGENCY DEPT VISIT HI MDM: CPT | Mod: 25

## 2020-10-21 PROCEDURE — 83690 ASSAY OF LIPASE: CPT

## 2020-10-21 PROCEDURE — 86900 BLOOD TYPING SEROLOGIC ABO: CPT

## 2020-10-21 PROCEDURE — 36415 COLL VENOUS BLD VENIPUNCTURE: CPT

## 2020-10-21 PROCEDURE — 96374 THER/PROPH/DIAG INJ IV PUSH: CPT

## 2020-10-21 PROCEDURE — U0003: CPT

## 2020-10-21 PROCEDURE — 71045 X-RAY EXAM CHEST 1 VIEW: CPT

## 2020-10-21 PROCEDURE — 86901 BLOOD TYPING SEROLOGIC RH(D): CPT

## 2020-10-21 PROCEDURE — 86850 RBC ANTIBODY SCREEN: CPT

## 2020-10-21 PROCEDURE — 85027 COMPLETE CBC AUTOMATED: CPT

## 2020-10-21 PROCEDURE — 85730 THROMBOPLASTIN TIME PARTIAL: CPT

## 2020-10-21 PROCEDURE — 80053 COMPREHEN METABOLIC PANEL: CPT

## 2020-10-21 PROCEDURE — 83735 ASSAY OF MAGNESIUM: CPT

## 2020-10-21 PROCEDURE — 84100 ASSAY OF PHOSPHORUS: CPT

## 2020-10-21 PROCEDURE — 85610 PROTHROMBIN TIME: CPT

## 2020-10-21 PROCEDURE — 80048 BASIC METABOLIC PNL TOTAL CA: CPT

## 2020-10-21 PROCEDURE — 93005 ELECTROCARDIOGRAM TRACING: CPT

## 2021-05-18 NOTE — DISCHARGE NOTE NURSING/CASE MANAGEMENT/SOCIAL WORK - NSDCVIVACCINE_GEN_ALL_CORE_FT
Wound Care: Petrolatum Type Of Destruction Used: Curettage Electrodesiccation And Curettage Text: The wound bed was treated with electrodesiccation and curettage after the biopsy was performed. Hide Second Anesthesia?: No Anesthesia Volume In Cc: 1 No Vaccines Administered. Hemostasis: Drysol Size Of Lesion In Cm: 0 Post-Care Instructions: I reviewed with the patient in detail post-care instructions. Patient is to keep the biopsy site dry overnight, and then apply bacitracin twice daily until healed. Patient may apply hydrogen peroxide soaks to remove any crusting. Curettage Text: The wound bed was treated with curettage after the biopsy was performed. Detail Level: Detailed Notification Instructions: Patient will be notified of biopsy results. However, patient instructed to call the office if not contacted within 2 weeks. Consent: Written consent was obtained and risks were reviewed including but not limited to scarring, infection, bleeding, scabbing, incomplete removal, nerve damage and allergy to anesthesia. Was A Bandage Applied: Yes Biopsy Method: Dermablade Dressing: bandage Silver Nitrate Text: The wound bed was treated with silver nitrate after the biopsy was performed. Billing Type: Third-Party Bill Anesthesia Type: 1% lidocaine with epinephrine and a 1:10 solution of 8.4% sodium bicarbonate Depth Of Biopsy: dermis Biopsy Type: H and E Cryotherapy Text: The wound bed was treated with cryotherapy after the biopsy was performed. Electrodesiccation Text: The wound bed was treated with electrodesiccation after the biopsy was performed. Information: Selecting Yes will display possible errors in your note based on the variables you have selected. This validation is only offered as a suggestion for you. PLEASE NOTE THAT THE VALIDATION TEXT WILL BE REMOVED WHEN YOU FINALIZE YOUR NOTE. IF YOU WANT TO FAX A PRELIMINARY NOTE YOU WILL NEED TO TOGGLE THIS TO 'NO' IF YOU DO NOT WANT IT IN YOUR FAXED NOTE.